# Patient Record
Sex: MALE | Race: WHITE | NOT HISPANIC OR LATINO
[De-identification: names, ages, dates, MRNs, and addresses within clinical notes are randomized per-mention and may not be internally consistent; named-entity substitution may affect disease eponyms.]

---

## 2021-06-08 PROBLEM — Z00.00 ENCOUNTER FOR PREVENTIVE HEALTH EXAMINATION: Status: ACTIVE | Noted: 2021-06-08

## 2021-06-10 ENCOUNTER — RESULT REVIEW (OUTPATIENT)
Age: 41
End: 2021-06-10

## 2021-06-10 ENCOUNTER — LABORATORY RESULT (OUTPATIENT)
Age: 41
End: 2021-06-10

## 2021-06-10 ENCOUNTER — APPOINTMENT (OUTPATIENT)
Dept: RHEUMATOLOGY | Facility: CLINIC | Age: 41
End: 2021-06-10
Payer: MEDICAID

## 2021-06-10 ENCOUNTER — OUTPATIENT (OUTPATIENT)
Dept: OUTPATIENT SERVICES | Facility: HOSPITAL | Age: 41
LOS: 1 days | End: 2021-06-10

## 2021-06-10 ENCOUNTER — APPOINTMENT (OUTPATIENT)
Dept: RADIOLOGY | Facility: CLINIC | Age: 41
End: 2021-06-10
Payer: MEDICAID

## 2021-06-10 VITALS
DIASTOLIC BLOOD PRESSURE: 50 MMHG | BODY MASS INDEX: 23.34 KG/M2 | OXYGEN SATURATION: 97 % | SYSTOLIC BLOOD PRESSURE: 93 MMHG | TEMPERATURE: 98.3 F | HEIGHT: 70 IN | HEART RATE: 67 BPM | WEIGHT: 163 LBS

## 2021-06-10 DIAGNOSIS — Z78.9 OTHER SPECIFIED HEALTH STATUS: ICD-10-CM

## 2021-06-10 DIAGNOSIS — M25.542 PAIN IN JOINTS OF LEFT HAND: ICD-10-CM

## 2021-06-10 DIAGNOSIS — Z80.8 FAMILY HISTORY OF MALIGNANT NEOPLASM OF OTHER ORGANS OR SYSTEMS: ICD-10-CM

## 2021-06-10 DIAGNOSIS — M54.5 LOW BACK PAIN: ICD-10-CM

## 2021-06-10 DIAGNOSIS — M79.645 PAIN IN LEFT FINGER(S): ICD-10-CM

## 2021-06-10 PROCEDURE — 99204 OFFICE O/P NEW MOD 45 MIN: CPT | Mod: 25

## 2021-06-10 PROCEDURE — 73120 X-RAY EXAM OF HAND: CPT | Mod: 26,LT,RT

## 2021-06-10 PROCEDURE — 72170 X-RAY EXAM OF PELVIS: CPT | Mod: 26

## 2021-06-11 LAB
ALBUMIN SERPL ELPH-MCNC: 4.6 G/DL
ALP BLD-CCNC: 37 U/L
ALT SERPL-CCNC: 19 U/L
ANION GAP SERPL CALC-SCNC: 10 MMOL/L
AST SERPL-CCNC: 24 U/L
B BURGDOR IGG+IGM SER QL IB: NORMAL
BASOPHILS # BLD AUTO: 0.03 K/UL
BASOPHILS NFR BLD AUTO: 0.4 %
BILIRUB SERPL-MCNC: 0.8 MG/DL
BUN SERPL-MCNC: 11 MG/DL
CALCIUM SERPL-MCNC: 9.6 MG/DL
CHLORIDE SERPL-SCNC: 105 MMOL/L
CO2 SERPL-SCNC: 28 MMOL/L
CREAT SERPL-MCNC: 0.86 MG/DL
CRP SERPL-MCNC: <3 MG/L
EOSINOPHIL # BLD AUTO: 0.08 K/UL
EOSINOPHIL NFR BLD AUTO: 1.1 %
ERYTHROCYTE [SEDIMENTATION RATE] IN BLOOD BY WESTERGREN METHOD: 2 MM/HR
GLUCOSE SERPL-MCNC: 86 MG/DL
HBV SURFACE AB SER QL: REACTIVE
HBV SURFACE AG SER QL: NONREACTIVE
HCT VFR BLD CALC: 45 %
HGB BLD-MCNC: 14.7 G/DL
IMM GRANULOCYTES NFR BLD AUTO: 0.3 %
LYMPHOCYTES # BLD AUTO: 1.36 K/UL
LYMPHOCYTES NFR BLD AUTO: 18.9 %
MAN DIFF?: NORMAL
MCHC RBC-ENTMCNC: 31.4 PG
MCHC RBC-ENTMCNC: 32.7 GM/DL
MCV RBC AUTO: 96.2 FL
MONOCYTES # BLD AUTO: 0.42 K/UL
MONOCYTES NFR BLD AUTO: 5.8 %
NEUTROPHILS # BLD AUTO: 5.3 K/UL
NEUTROPHILS NFR BLD AUTO: 73.5 %
PLATELET # BLD AUTO: 203 K/UL
POTASSIUM SERPL-SCNC: 4.5 MMOL/L
PROT SERPL-MCNC: 6.5 G/DL
RBC # BLD: 4.68 M/UL
RBC # FLD: 11.9 %
RHEUMATOID FACT SER QL: <10 IU/ML
SODIUM SERPL-SCNC: 143 MMOL/L
WBC # FLD AUTO: 7.21 K/UL

## 2021-06-14 LAB
CCP AB SER IA-ACNC: <8 UNITS
M TB IFN-G BLD-IMP: NEGATIVE
QUANTIFERON TB PLUS MITOGEN MINUS NIL: 9.96 IU/ML
QUANTIFERON TB PLUS NIL: 0.02 IU/ML
QUANTIFERON TB PLUS TB1 MINUS NIL: 0.04 IU/ML
QUANTIFERON TB PLUS TB2 MINUS NIL: 0.05 IU/ML
RF+CCP IGG SER-IMP: NEGATIVE

## 2021-06-15 PROBLEM — Z80.8 FAMILY HISTORY OF MALIGNANT NEOPLASM OF SKIN: Status: ACTIVE | Noted: 2021-06-10

## 2021-06-15 PROBLEM — Z78.9 NON-SMOKER: Status: ACTIVE | Noted: 2021-06-10

## 2021-06-15 PROBLEM — Z78.9 SOCIAL ALCOHOL USE: Status: ACTIVE | Noted: 2021-06-10

## 2021-06-15 PROBLEM — Z78.9 CAFFEINE USE: Status: ACTIVE | Noted: 2021-06-10

## 2021-06-15 NOTE — PHYSICAL EXAM
[General Appearance - Alert] : alert [General Appearance - In No Acute Distress] : in no acute distress [General Appearance - Well-Appearing] : healthy appearing [Sclera] : the sclera and conjunctiva were normal [Neck Appearance] : the appearance of the neck was normal [Respiration, Rhythm And Depth] : normal respiratory rhythm and effort [Exaggerated Use Of Accessory Muscles For Inspiration] : no accessory muscle use [Edema] : there was no peripheral edema [Abnormal Walk] : normal gait [Nail Clubbing] : no clubbing  or cyanosis of the fingernails [Musculoskeletal - Swelling] : no joint swelling seen [Motor Tone] : muscle strength and tone were normal [] : no rash [Skin Lesions] : no skin lesions [Oriented To Time, Place, And Person] : oriented to person, place, and time [Impaired Insight] : insight and judgment were intact [Affect] : the affect was normal [Mood] : the mood was normal [FreeTextEntry1] : No active synovitis of the upper and lower extremities bilaterally. Decreased handgrip of the left  hand due to pain. decreased flexion of the left thumb.  no palmar nodules noted.

## 2021-06-15 NOTE — ASSESSMENT
[FreeTextEntry1] : 40 year old with longstanding history of psoriasis, persists despite topical treatments, now feels worsening.  Patient also with left hand pain and stiffness, particularly in the first CMC and IP joints, with decrease rang of motion.  Patient concerned about possible psoriatic arthritis and feels quite limited by the pain and stiffness. Previous imaging negative for erosive changes.  Discussed alternative treatment options with patient regarding biologics for psoriasis, such as cosentyx, which may also provide benefit if underlying psoriasic arthritis present as well. Patient with history of depression and anxiety and would avoid otezla at this time given black box warning regarding depression.  Will update labs today, will check xray of the pelvis and hands, CBC, CMP, ESR, CRP, RF, CCP, quantiferon, hepatitis B and C as well,further management pending results.

## 2021-06-15 NOTE — HISTORY OF PRESENT ILLNESS
[FreeTextEntry1] : 40 year old man with longstanding history of psoriasis\par Psoriasis for years \par Feels getting worse\par Plaques worsening\par Previously only in the hairline and scalp, since moved to elbows, face, hairline, and legs\par \par Following with dermatologist\par Tried steroid cream, topicals, shampoos, zinc\par Tried fluconazole topical and orally as well\par Recently inside the ears as well\par \par Exfoliating and shea butter, started topical CBD which is helping\par \par One year ago developed soreness in the hands\par left hand when wakes up, hard to bend thumb, hard to ties shoes, feels as though fingers are being torn apart\par Patient is right hand dominant \par \par Last year was seen by rheumatologist, nothing visible on the xrays\par have had stiffness in the hands for years, but not pain\par Tried different complementary \par acupuncture, meditation which did not help too much with the pain in the left hand\par No trigger finger at this time as per patient\par \par No medical problems\par No known drug allergy\par history of anxiety and depression\par \par +back pain without morning stiffness, worsens with activities, better at rest\par comes and goes\par No family history of psoriasis\par No morning stiffness of the back, has some intermittent back pain/\par no other joints involved at this time\par

## 2021-06-15 NOTE — DATA REVIEWED
[FreeTextEntry1] : Labs from previous rheumatologist labs, scanned in allscripts.\par Xrays of the pelvis and hands reviewed and scanned in chart\par

## 2021-07-02 ENCOUNTER — RX RENEWAL (OUTPATIENT)
Age: 41
End: 2021-07-02

## 2021-11-16 ENCOUNTER — APPOINTMENT (OUTPATIENT)
Dept: RHEUMATOLOGY | Facility: CLINIC | Age: 41
End: 2021-11-16
Payer: MEDICAID

## 2021-11-16 VITALS
SYSTOLIC BLOOD PRESSURE: 100 MMHG | TEMPERATURE: 95.8 F | HEIGHT: 70 IN | OXYGEN SATURATION: 96 % | HEART RATE: 62 BPM | BODY MASS INDEX: 22.9 KG/M2 | WEIGHT: 160 LBS | DIASTOLIC BLOOD PRESSURE: 55 MMHG

## 2021-11-16 PROCEDURE — 99214 OFFICE O/P EST MOD 30 MIN: CPT | Mod: 25

## 2021-11-16 NOTE — ASSESSMENT
[FreeTextEntry1] : 40 year old with longstanding history of psoriasis and psoriatic arthritis, returns for follow up.  Patient started cosentyx persists June 2021 with good response in terms of skin and joint manifestations.  Patient feels slowly improved over the past four months and now feels improvement over the past month.  Hand pain much improved, now with intermittent pain in the second and third fingers, not present at this time.  Previous xrays without erosive changes.  Patient is doing well on cosentyx and will continue at this time.  Will update labs today in office, will check CBC, CMP, ESR, and CRP.  Patient with history of depression and anxiety and would avoid otezla at this time given black box warning regarding depression. Follow up in four months or sooner as needed.

## 2021-11-16 NOTE — REVIEW OF SYSTEMS
[Arthralgias] : arthralgias [Negative] : Heme/Lymph [FreeTextEntry9] : left 2nd and 3rd fingers [de-identified] : intermittent psoriasis

## 2021-11-16 NOTE — PHYSICAL EXAM
[General Appearance - Alert] : alert [General Appearance - In No Acute Distress] : in no acute distress [General Appearance - Well-Appearing] : healthy appearing [Sclera] : the sclera and conjunctiva were normal [Respiration, Rhythm And Depth] : normal respiratory rhythm and effort [Exaggerated Use Of Accessory Muscles For Inspiration] : no accessory muscle use [Edema] : there was no peripheral edema [Abnormal Walk] : normal gait [Nail Clubbing] : no clubbing  or cyanosis of the fingernails [Musculoskeletal - Swelling] : no joint swelling seen [Motor Tone] : muscle strength and tone were normal [Skin Color & Pigmentation] : normal skin color and pigmentation [] : no rash [Oriented To Time, Place, And Person] : oriented to person, place, and time [Impaired Insight] : insight and judgment were intact [Affect] : the affect was normal [FreeTextEntry1] : No active synovitis of the upper and lower extremities bilaterally.  FROM of all joints

## 2021-11-16 NOTE — HISTORY OF PRESENT ILLNESS
[FreeTextEntry1] : 40 year old man with longstanding history of psoriasis\par Psoriasis for years \par Feels getting worse\par Plaques worsening\par Previously only in the hairline and scalp, since moved to elbows, face, hairline, and legs\par \par Following with dermatologist\par Tried steroid cream, topicals, shampoos, zinc\par Tried fluconazole topical and orally as well\par Recently inside the ears as well\par \par Exfoliating and shea butter, started topical CBD which is helping\par \par One year ago developed soreness in the hands\par left hand when wakes up, hard to bend thumb, hard to ties shoes, feels as though fingers are being torn apart\par Patient is right hand dominant \par \par Last year was seen by rheumatologist, nothing visible on the xrays\par have had stiffness in the hands for years, but not pain\par Tried different complementary \par acupuncture, meditation which did not help too much with the pain in the left hand\par No trigger finger at this time as per patient\par \par No medical problems\par No known drug allergy\par history of anxiety and depression\par \par +back pain without morning stiffness, worsens with activities, better at rest\par comes and goes\par No family history of psoriasis\par No morning stiffness of the back, has some intermittent back pain/\par no other joints involved at this time\par \par November 16, 2021\par Psoriasis much improved, "pretty much gone"\par Feels CBD really helped the skin as well\par Joint pain improved about one month ago, now does think about too much\par left 2nd and 3rd fingers with some pain intermittently., not at this time\par Able to complete art work, play sports\par No recent infections\par Felt an increase in canker sores initially when started cosentyx but now less frequent\par Steroid cream intermittently for the psoriasis\par Will update labs today

## 2021-11-17 LAB
ALBUMIN SERPL ELPH-MCNC: 4.6 G/DL
ALP BLD-CCNC: 37 U/L
ALT SERPL-CCNC: 20 U/L
ANION GAP SERPL CALC-SCNC: 12 MMOL/L
AST SERPL-CCNC: 39 U/L
BASOPHILS # BLD AUTO: 0.03 K/UL
BASOPHILS NFR BLD AUTO: 0.8 %
BILIRUB SERPL-MCNC: 0.8 MG/DL
BUN SERPL-MCNC: 10 MG/DL
CALCIUM SERPL-MCNC: 9.4 MG/DL
CHLORIDE SERPL-SCNC: 103 MMOL/L
CO2 SERPL-SCNC: 27 MMOL/L
CREAT SERPL-MCNC: 0.88 MG/DL
CRP SERPL-MCNC: <3 MG/L
EOSINOPHIL # BLD AUTO: 0.09 K/UL
EOSINOPHIL NFR BLD AUTO: 2.5 %
ERYTHROCYTE [SEDIMENTATION RATE] IN BLOOD BY WESTERGREN METHOD: 2 MM/HR
GLUCOSE SERPL-MCNC: 80 MG/DL
HCT VFR BLD CALC: 43.3 %
HGB BLD-MCNC: 14.3 G/DL
IMM GRANULOCYTES NFR BLD AUTO: 0.3 %
LYMPHOCYTES # BLD AUTO: 1.23 K/UL
LYMPHOCYTES NFR BLD AUTO: 33.9 %
MAN DIFF?: NORMAL
MCHC RBC-ENTMCNC: 31.6 PG
MCHC RBC-ENTMCNC: 33 GM/DL
MCV RBC AUTO: 95.6 FL
MONOCYTES # BLD AUTO: 0.39 K/UL
MONOCYTES NFR BLD AUTO: 10.7 %
NEUTROPHILS # BLD AUTO: 1.88 K/UL
NEUTROPHILS NFR BLD AUTO: 51.8 %
PLATELET # BLD AUTO: 164 K/UL
POTASSIUM SERPL-SCNC: 4.4 MMOL/L
PROT SERPL-MCNC: 6.5 G/DL
RBC # BLD: 4.53 M/UL
RBC # FLD: 11.7 %
SODIUM SERPL-SCNC: 142 MMOL/L
WBC # FLD AUTO: 3.63 K/UL

## 2022-01-26 ENCOUNTER — APPOINTMENT (OUTPATIENT)
Dept: RHEUMATOLOGY | Facility: CLINIC | Age: 42
End: 2022-01-26
Payer: MEDICAID

## 2022-01-26 VITALS
HEIGHT: 70 IN | SYSTOLIC BLOOD PRESSURE: 118 MMHG | TEMPERATURE: 98.4 F | BODY MASS INDEX: 22.19 KG/M2 | DIASTOLIC BLOOD PRESSURE: 67 MMHG | OXYGEN SATURATION: 97 % | HEART RATE: 64 BPM | WEIGHT: 155 LBS

## 2022-01-26 PROCEDURE — 99214 OFFICE O/P EST MOD 30 MIN: CPT

## 2022-01-26 NOTE — PHYSICAL EXAM
[General Appearance - Alert] : alert [General Appearance - In No Acute Distress] : in no acute distress [General Appearance - Well-Appearing] : healthy appearing [Sclera] : the sclera and conjunctiva were normal [] : no respiratory distress [Respiration, Rhythm And Depth] : normal respiratory rhythm and effort [Exaggerated Use Of Accessory Muscles For Inspiration] : no accessory muscle use [Abnormal Walk] : normal gait [Nail Clubbing] : no clubbing  or cyanosis of the fingernails [Musculoskeletal - Swelling] : no joint swelling seen [Motor Tone] : muscle strength and tone were normal [Oriented To Time, Place, And Person] : oriented to person, place, and time [Impaired Insight] : insight and judgment were intact [Affect] : the affect was normal [FreeTextEntry1] : erthema around the left eye with dry skin, erythmatous areas on the forehead, left cheek

## 2022-01-26 NOTE — HISTORY OF PRESENT ILLNESS
[FreeTextEntry1] : 41 year old man with longstanding history of psoriasis returns for follow up\par \par Rosina 10, 2021\par Psoriasis for years \par Feels getting worse\par Plaques worsening\par Previously only in the hairline and scalp, since moved to elbows, face, hairline, and legs\par \par Following with dermatologist\par Tried steroid cream, topicals, shampoos, zinc\par Tried fluconazole topical and orally as well\par Recently inside the ears as well\par \par Exfoliating and shea butter, started topical CBD which is helping\par \par One year ago developed soreness in the hands\par left hand when wakes up, hard to bend thumb, hard to ties shoes, feels as though fingers are being torn apart\par Patient is right hand dominant \par \par Last year was seen by rheumatologist, nothing visible on the xrays\par have had stiffness in the hands for years, but not pain\par Tried different complementary \par acupuncture, meditation which did not help too much with the pain in the left hand\par No trigger finger at this time as per patient\par \par No medical problems\par No known drug allergy\par history of anxiety and depression\par \par +back pain without morning stiffness, worsens with activities, better at rest\par comes and goes\par No family history of psoriasis\par No morning stiffness of the back, has some intermittent back pain/\par no other joints involved at this time\par \par November 16, 2021\par Psoriasis much improved, "pretty much gone"\par Feels CBD really helped the skin as well\par Joint pain improved about one month ago, now does think about too much\par left 2nd and 3rd fingers with some pain intermittently., not at this time\par Able to complete art work, play sports\par No recent infections\par Felt an increase in canker sores initially when started cosentyx but now less frequent\par Steroid cream intermittently for the psoriasis\par Will update labs today\par \par January 26, 2022\par Feels an increase in itching for the past three weeks\par Saw dermatologist, prescribed steroid cream, using but not helping\par Only thing that helped in the last few days, home made balm, face only, forehead, around the eys, behgnd the ears\par Did not think related to psoraisis as usually does not itch\par For the past three weeks, on the face, no new soaps, lotions\par Started a face wash which is very gentle, not helping\par Next cosentyx, February 18\par Started on new diet January 1, AIP diet unclear if related to new symptoms, feels increase in almonds and may have some allergy? \par

## 2022-01-26 NOTE — ASSESSMENT
[FreeTextEntry1] : 41 year old with longstanding history of psoriasis and psoriatic arthritis, returns for follow up.  Patient started cosentyx  June 2021 with good response in terms of skin and joint manifestations. Joint pains much improved as well.  At this time, patient with increasing skin lesions on the face with increased pruritus,  Patient was seen by outside dermatologist, prescribed topical steroids, but did not feel too much benefit to date. Will refer to dermatology for second opinion.  Will hold next dose of cosentyx pending dermatology evaluation as well in case related to cosentyx although less likely. Patient with new diet, unclear if may be allergic as well. Discussed cool mist humidifier, avoid hot showers, and alcohol based cleansers.  Dermatology eval pending.

## 2022-09-12 ENCOUNTER — LABORATORY RESULT (OUTPATIENT)
Age: 42
End: 2022-09-12

## 2022-09-12 ENCOUNTER — APPOINTMENT (OUTPATIENT)
Dept: RHEUMATOLOGY | Facility: CLINIC | Age: 42
End: 2022-09-12

## 2022-09-12 VITALS
TEMPERATURE: 98.2 F | HEART RATE: 60 BPM | WEIGHT: 161 LBS | SYSTOLIC BLOOD PRESSURE: 115 MMHG | BODY MASS INDEX: 23.05 KG/M2 | OXYGEN SATURATION: 98 % | DIASTOLIC BLOOD PRESSURE: 76 MMHG | HEIGHT: 70 IN

## 2022-09-12 DIAGNOSIS — R20.2 PARESTHESIA OF SKIN: ICD-10-CM

## 2022-09-12 DIAGNOSIS — L40.9 PSORIASIS, UNSPECIFIED: ICD-10-CM

## 2022-09-12 DIAGNOSIS — L40.50 ARTHROPATHIC PSORIASIS, UNSPECIFIED: ICD-10-CM

## 2022-09-12 DIAGNOSIS — R21 RASH AND OTHER NONSPECIFIC SKIN ERUPTION: ICD-10-CM

## 2022-09-12 PROCEDURE — 36415 COLL VENOUS BLD VENIPUNCTURE: CPT

## 2022-09-12 PROCEDURE — 99214 OFFICE O/P EST MOD 30 MIN: CPT | Mod: 25

## 2022-09-12 NOTE — HISTORY OF PRESENT ILLNESS
[FreeTextEntry1] : 41 year old man with longstanding history of psoriasis returns for follow up\par \par Rosina 10, 2021\par Psoriasis for years \par Feels getting worse\par Plaques worsening\par Previously only in the hairline and scalp, since moved to elbows, face, hairline, and legs\par \par Following with dermatologist\par Tried steroid cream, topicals, shampoos, zinc\par Tried fluconazole topical and orally as well\par Recently inside the ears as well\par \par Exfoliating and shea butter, started topical CBD which is helping\par \par One year ago developed soreness in the hands\par left hand when wakes up, hard to bend thumb, hard to ties shoes, feels as though fingers are being torn apart\par Patient is right hand dominant \par \par Last year was seen by rheumatologist, nothing visible on the xrays\par have had stiffness in the hands for years, but not pain\par Tried different complementary \par acupuncture, meditation which did not help too much with the pain in the left hand\par No trigger finger at this time as per patient\par \par No medical problems\par No known drug allergy\par history of anxiety and depression\par \par +back pain without morning stiffness, worsens with activities, better at rest\par comes and goes\par No family history of psoriasis\par No morning stiffness of the back, has some intermittent back pain/\par no other joints involved at this time\par \par November 16, 2021\par Psoriasis much improved, "pretty much gone"\par Feels CBD really helped the skin as well\par Joint pain improved about one month ago, now does think about too much\par left 2nd and 3rd fingers with some pain intermittently., not at this time\par Able to complete art work, play sports\par No recent infections\par Felt an increase in canker sores initially when started cosentyx but now less frequent\par Steroid cream intermittently for the psoriasis\par Will update labs today\par \par January 26, 2022\par Feels an increase in itching for the past three weeks\par Saw dermatologist, prescribed steroid cream, using but not helping\par Only thing that helped in the last few days, home made balm, face only, forehead, around the eyes, behind the ears\par Did not think related to psoriasis as usually does not itch\par For the past three weeks, on the face, no new soaps, lotions\par Started a face wash which is very gentle, not helping\par Next cosentyx, February 18\par Started on new diet January 1, AIP diet unclear if related to new symptoms, feels increase in almonds and may have some allergy? \par \par September 12, 2022\par Patient returns for follow-up, feeling well\par Psoriasis greatly improved sometimes with some increase in scalp lesions\par Feels some paresthesias on the top of the hands, mostly in the cold temperatures for the past few months\par Symptoms are not present in the fingertips\par Does not occur during nighttime, does not wake up from sleep\par Lasts for about an hour, nothing makes it feel better when occurs, shaking the hand does not improve symptoms\par Last 6 months, had back pain, was seeing PT \par Taking cosentyx every 4 weeks, no side effects noted\par Previous pruritus of the skin of the face resolved on own\par No recent covid infection, had all the symptoms a few months ago and wife tested positive, but never tested positive\par \par

## 2022-09-12 NOTE — DATA REVIEWED
[FreeTextEntry1] : \par  XR PELVIS AP ONLY 1 OR 2 VIEWS             Final\par \par No Documents Attached\par \par \par \par \par   EXAM:  XR PELVIS AP ONLY 1-2 VIEWS\par \par PROCEDURE DATE:  06/10/2021\par \par \par \par \par INTERPRETATION:  INDICATION: Back pain, evaluate SI joints\par \par TECHNIQUE: AP view of the pelvis\par \par COMPARISON: None available\par \par FINDINGS:\par \par There is no fracture or dislocation. The joint spaces and alignment are preserved. Normal appearance of the sacroiliac joints. There is no focal soft tissue abnormality.\par \par \par IMPRESSION:\par \par Normal appearance of the SI joints.\par \par \par \par \par \par \par JOSE FONSECA MD; Attending Radiologist\par This document has been electronically signed. Tyler 10 2021  4:12PM\par \par  \par \par  Ordered by: MAHNAZ YUN       Collected/Examined: 10Jun2021 03:55PM       \par Verified by: MAHNAZ YUN 10Jun2021 04:28PM       \par  Result Communication: No patient communication needed at this time;\par Stage: Final       \par  Performed at: [facility]       Resulted: 10Jun2021 04:11PM       Last Updated: 10Jun2021 04:28PM       Accession: [id number]       \par \par \par  Xray Hand 2 Views, Bilateral             Final\par \par No Documents Attached\par \par \par \par \par   EXAM:  XR HAND 2 VIEWS BI\par \par PROCEDURE DATE:  06/10/2021\par \par \par \par \par INTERPRETATION:  INDICATION: Bilateral hand pain\par \par TECHNIQUE: 2 views of each hand\par \par COMPARISON: None available\par \par FINDINGS:\par \par There is no fracture or dislocation. The joint spaces and alignment are preserved. No periarticular erosive changes. There is no focal soft tissue abnormality.\par \par \par IMPRESSION:\par \par No radiographic evidence for inflammatory arthropathy.\par \par \par \par \par \par \par JOSE FONSECA MD; Attending Radiologist\par This document has been electronically signed. Tyler 10 2021  4:13PM\par \par  \par \par  Ordered by: MAHNAZ YUN       Collected/Examined: 10Jun2021 03:55PM       \par Verified by: MAHNAZ YUN 10Jun2021 04:28PM       \par  Result Communication: No patient communication needed at this time;\par Stage: Final       \par  Performed at: [facility]       Resulted: 10Jun2021 04:12PM       Last Updated: 10Jun2021 04:28PM       Accession: [id number]       \par \par Labs from 2020:\par HLA-B27\par Rheumatoid factor negative\par CCP negative\par KAYLEEN negative\par ESR 2\par CRP 0.3\par QuantiFERON negative\par

## 2022-09-12 NOTE — REVIEW OF SYSTEMS
[Negative] : Heme/Lymph [de-identified] : Paresthesias over the dorsum of the hand, not present at this time

## 2022-09-12 NOTE — ASSESSMENT
[FreeTextEntry1] : 41 year old with longstanding history of psoriasis and psoriatic arthritis, returns for follow up.  Patient started cosentyx, June 2021, with good response in terms of skin and joint manifestations. Joint pains much improved as well.  At this time patient reports an increase in low back pain as well as cervical muscle spasm, previously followed by physical therapy, will refer to physiatry for further evaluation.  Patient also with intermittent paresthesias over the dorsum of the hand, not present at this time, does not appear consistent with carpal tunnel syndrome.  Will update blood test today in office including CBC, CMP, ESR, CRP, Lyme testing as spent summer in [place], as well as B12 to further evaluate paresthesias, discussed possible EMG pending physiatry evaluation.  Will continue Cosentyx 300 mg every 4 weeks for now.  We will update QuantiFERON as well.  Flu vaccine and COVID-vaccine booster when available.  Follow-up in 4 months or sooner as needed.

## 2022-09-12 NOTE — PHYSICAL EXAM
[General Appearance - Alert] : alert [General Appearance - In No Acute Distress] : in no acute distress [General Appearance - Well-Appearing] : healthy appearing [Sclera] : the sclera and conjunctiva were normal [] : no respiratory distress [Respiration, Rhythm And Depth] : normal respiratory rhythm and effort [Exaggerated Use Of Accessory Muscles For Inspiration] : no accessory muscle use [Edema] : there was no peripheral edema [Abnormal Walk] : normal gait [Nail Clubbing] : no clubbing  or cyanosis of the fingernails [Musculoskeletal - Swelling] : no joint swelling seen [Motor Tone] : muscle strength and tone were normal [Oriented To Time, Place, And Person] : oriented to person, place, and time [Impaired Insight] : insight and judgment were intact [Affect] : the affect was normal [FreeTextEntry1] : No active synovitis of the upper and lower extremities bilaterally.  Negative Tinel's bilaterally.  Muscle strength intact in all 4 extremities

## 2022-09-13 ENCOUNTER — NON-APPOINTMENT (OUTPATIENT)
Age: 42
End: 2022-09-13

## 2022-09-13 DIAGNOSIS — R74.01 ELEVATION OF LEVELS OF LIVER TRANSAMINASE LEVELS: ICD-10-CM

## 2022-09-13 LAB
ALBUMIN SERPL ELPH-MCNC: 4.8 G/DL
ALP BLD-CCNC: 42 U/L
ALT SERPL-CCNC: 41 U/L
ANION GAP SERPL CALC-SCNC: 11 MMOL/L
AST SERPL-CCNC: 69 U/L
BASOPHILS # BLD AUTO: 0.04 K/UL
BASOPHILS NFR BLD AUTO: 0.9 %
BILIRUB SERPL-MCNC: 1 MG/DL
BUN SERPL-MCNC: 12 MG/DL
CALCIUM SERPL-MCNC: 9.9 MG/DL
CHLORIDE SERPL-SCNC: 101 MMOL/L
CO2 SERPL-SCNC: 29 MMOL/L
CREAT SERPL-MCNC: 0.86 MG/DL
CRP SERPL-MCNC: <3 MG/L
EGFR: 111 ML/MIN/1.73M2
EOSINOPHIL # BLD AUTO: 0.09 K/UL
EOSINOPHIL NFR BLD AUTO: 2 %
ERYTHROCYTE [SEDIMENTATION RATE] IN BLOOD BY WESTERGREN METHOD: 3 MM/HR
GLUCOSE SERPL-MCNC: 87 MG/DL
HCT VFR BLD CALC: 45.3 %
HGB BLD-MCNC: 15.2 G/DL
IMM GRANULOCYTES NFR BLD AUTO: 0 %
LYMPHOCYTES # BLD AUTO: 1.34 K/UL
LYMPHOCYTES NFR BLD AUTO: 29.1 %
MAN DIFF?: NORMAL
MCHC RBC-ENTMCNC: 32.4 PG
MCHC RBC-ENTMCNC: 33.6 GM/DL
MCV RBC AUTO: 96.6 FL
MONOCYTES # BLD AUTO: 0.39 K/UL
MONOCYTES NFR BLD AUTO: 8.5 %
NEUTROPHILS # BLD AUTO: 2.75 K/UL
NEUTROPHILS NFR BLD AUTO: 59.5 %
PLATELET # BLD AUTO: 196 K/UL
POTASSIUM SERPL-SCNC: 4.5 MMOL/L
PROT SERPL-MCNC: 6.8 G/DL
RBC # BLD: 4.69 M/UL
RBC # FLD: 12 %
SODIUM SERPL-SCNC: 140 MMOL/L
VIT B12 SERPL-MCNC: 486 PG/ML
WBC # FLD AUTO: 4.61 K/UL

## 2022-09-15 LAB
M TB IFN-G BLD-IMP: NEGATIVE
QUANTIFERON TB PLUS MITOGEN MINUS NIL: >10 IU/ML
QUANTIFERON TB PLUS NIL: 0.02 IU/ML
QUANTIFERON TB PLUS TB1 MINUS NIL: 0.02 IU/ML
QUANTIFERON TB PLUS TB2 MINUS NIL: 0.05 IU/ML

## 2022-09-16 ENCOUNTER — APPOINTMENT (OUTPATIENT)
Dept: RHEUMATOLOGY | Facility: CLINIC | Age: 42
End: 2022-09-16

## 2022-09-16 PROCEDURE — 36415 COLL VENOUS BLD VENIPUNCTURE: CPT

## 2022-09-19 ENCOUNTER — NON-APPOINTMENT (OUTPATIENT)
Age: 42
End: 2022-09-19

## 2022-09-19 LAB
ALBUMIN SERPL ELPH-MCNC: 4.7 G/DL
ALP BLD-CCNC: 42 U/L
ALT SERPL-CCNC: 35 U/L
AST SERPL-CCNC: 37 U/L
BILIRUB DIRECT SERPL-MCNC: 0.1 MG/DL
BILIRUB INDIRECT SERPL-MCNC: 0.5 MG/DL
BILIRUB SERPL-MCNC: 0.6 MG/DL
HAV IGM SER QL: NONREACTIVE
HBV CORE IGM SER QL: NONREACTIVE
HBV SURFACE AG SER QL: NONREACTIVE
HCV AB SER QL: NONREACTIVE
HCV S/CO RATIO: 0.08 S/CO
PROT SERPL-MCNC: 6.7 G/DL

## 2022-09-28 RX ORDER — SECUKINUMAB 150 MG/ML
150 INJECTION SUBCUTANEOUS
Qty: 2 | Refills: 5 | Status: ACTIVE | COMMUNITY
Start: 2021-06-16 | End: 1900-01-01

## 2022-12-30 ENCOUNTER — NON-APPOINTMENT (OUTPATIENT)
Age: 42
End: 2022-12-30